# Patient Record
Sex: FEMALE | Race: WHITE | NOT HISPANIC OR LATINO | URBAN - METROPOLITAN AREA
[De-identification: names, ages, dates, MRNs, and addresses within clinical notes are randomized per-mention and may not be internally consistent; named-entity substitution may affect disease eponyms.]

---

## 2019-05-16 ENCOUNTER — OFFICE VISIT (OUTPATIENT)
Dept: URBAN - METROPOLITAN AREA CLINIC 17 | Facility: CLINIC | Age: 74
End: 2019-05-16
Payer: COMMERCIAL

## 2019-05-16 DIAGNOSIS — T37.2X5S ADVERSE EFFECT OF ANTIMALARIALS AND DRUGS ACTING ON OTHER BLOOD PROTOZOA, SEQUELA: ICD-10-CM

## 2019-05-16 DIAGNOSIS — H04.123 DRY EYE SYNDROME OF BILATERAL LACRIMAL GLANDS: ICD-10-CM

## 2019-05-16 PROCEDURE — 99213 OFFICE O/P EST LOW 20 MIN: CPT | Performed by: OPHTHALMOLOGY

## 2019-05-16 PROCEDURE — 92134 CPTRZ OPH DX IMG PST SGM RTA: CPT | Performed by: OPHTHALMOLOGY

## 2019-05-16 RX ORDER — PREDNISOLONE ACETATE 10 MG/ML
1 % SUSPENSION/ DROPS OPHTHALMIC
Qty: 1 | Refills: 3 | Status: ACTIVE
Start: 2019-05-16

## 2019-05-16 RX ORDER — CYCLOSPORINE 0.5 MG/ML
0.05 % EMULSION OPHTHALMIC
Qty: 180 | Refills: 3 | Status: ACTIVE
Start: 2019-05-16

## 2019-05-16 ASSESSMENT — INTRAOCULAR PRESSURE
OS: 8
OD: 9

## 2019-05-16 NOTE — IMPRESSION/PLAN
Impression: Other long term (current) drug therapy: Z79.899 OU. Plan: No toxicity seen. Okay to continue treatment.

## 2019-05-16 NOTE — IMPRESSION/PLAN
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 Plan: See above assessment.

## 2019-05-16 NOTE — IMPRESSION/PLAN
Impression: Dry eye syndrome of bilateral lacrimal glands: H04.123. Likely due to Plaquenil use. Plan: Discussed diagnosis in detail with patient. Continue Restasis BID OU (erx'd) & pred ace BID OU when symptoms flare up.

## 2019-05-16 NOTE — IMPRESSION/PLAN
Impression: Discoid lupus erythematosus: L93.0. Plan: Discussed condition with patient. There is no evidence of Plaquenil toxicity. Patient is okay to continue current treatment. Continue Restasis BID OU (erx'd) & pred ace BID OU when symptoms flare up.

## 2020-05-19 ENCOUNTER — OFFICE VISIT (OUTPATIENT)
Dept: URBAN - METROPOLITAN AREA CLINIC 17 | Facility: CLINIC | Age: 75
End: 2020-05-19
Payer: COMMERCIAL

## 2020-05-19 DIAGNOSIS — Z79.899 OTHER LONG TERM (CURRENT) DRUG THERAPY: ICD-10-CM

## 2020-05-19 DIAGNOSIS — L93.0 DISCOID LUPUS ERYTHEMATOSUS: Primary | ICD-10-CM

## 2020-05-19 PROCEDURE — 92014 COMPRE OPH EXAM EST PT 1/>: CPT | Performed by: OPHTHALMOLOGY

## 2020-05-19 PROCEDURE — 92134 CPTRZ OPH DX IMG PST SGM RTA: CPT | Performed by: OPHTHALMOLOGY

## 2020-05-19 ASSESSMENT — INTRAOCULAR PRESSURE
OS: 10
OD: 9

## 2021-03-23 ENCOUNTER — OFFICE VISIT (OUTPATIENT)
Dept: URBAN - METROPOLITAN AREA CLINIC 17 | Facility: CLINIC | Age: 76
End: 2021-03-23
Payer: COMMERCIAL

## 2021-03-23 DIAGNOSIS — S05.01XA CORNEAL ABRASION W/O FB OF RIGHT EYE, INITIAL ENCOUNTER: Primary | ICD-10-CM

## 2021-03-23 DIAGNOSIS — H57.11 OCULAR PAIN, RIGHT EYE: ICD-10-CM

## 2021-03-23 PROCEDURE — 92002 INTRM OPH EXAM NEW PATIENT: CPT | Performed by: OPTOMETRIST

## 2021-03-23 NOTE — IMPRESSION/PLAN
Impression: Corneal abrasion w/o FB of right eye, initial encounter: S05. 01XA. Plan: Discussed diagnosis in detail with patient. Discussed treatment options with patient. Discussed risks and benefits and patient understands. Advised patient of condition. Will continue to observe condition and or symptoms. Instilled Altafluor in OD and Placed a B-CTL in OD, pt advised lens will be removed on F/U, if lens falls out pt advised she will be fine.  Pt to continue Tobramycin Q2H OD

## 2021-03-25 ENCOUNTER — OFFICE VISIT (OUTPATIENT)
Dept: URBAN - METROPOLITAN AREA CLINIC 17 | Facility: CLINIC | Age: 76
End: 2021-03-25
Payer: COMMERCIAL

## 2021-03-25 DIAGNOSIS — S05.01XS: Primary | ICD-10-CM

## 2021-03-25 PROCEDURE — 99213 OFFICE O/P EST LOW 20 MIN: CPT | Performed by: OPTOMETRIST

## 2021-03-25 NOTE — IMPRESSION/PLAN
Impression: Corneal abrasion w/o foreign body of right eye, sequela: S05.01XS. Condition: resolved. Plan: Resolved. Removed BCTL today. Pt to continue Tobramycin QID x3days, then d/c.

## 2021-09-27 ENCOUNTER — OFFICE VISIT (OUTPATIENT)
Dept: URBAN - METROPOLITAN AREA CLINIC 17 | Facility: CLINIC | Age: 76
End: 2021-09-27
Payer: COMMERCIAL

## 2021-09-27 DIAGNOSIS — Z96.1 PRESENCE OF INTRAOCULAR LENS: ICD-10-CM

## 2021-09-27 DIAGNOSIS — H43.813 VITREOUS DEGENERATION, BILATERAL: ICD-10-CM

## 2021-09-27 DIAGNOSIS — T37.2X5D ADVERSE EFFECT OF ANTIMALARIAL, SUBSEQUENT ENCOUNTER: ICD-10-CM

## 2021-09-27 PROCEDURE — 92014 COMPRE OPH EXAM EST PT 1/>: CPT | Performed by: OPTOMETRIST

## 2021-09-27 PROCEDURE — 92083 EXTENDED VISUAL FIELD XM: CPT | Performed by: OPTOMETRIST

## 2021-09-27 PROCEDURE — 92134 CPTRZ OPH DX IMG PST SGM RTA: CPT | Performed by: OPTOMETRIST

## 2021-09-27 ASSESSMENT — INTRAOCULAR PRESSURE
OD: 12
OS: 12

## 2021-09-27 NOTE — IMPRESSION/PLAN
Impression: Other long term (current) drug therapy: Z79.899. Plan: Discussed condition with patient. There is no evidence of Plaquenil toxicity.  Patient is okay to continue current treatment

## 2021-09-27 NOTE — IMPRESSION/PLAN
Impression: Adverse effect of antimalarial, subsequent encounter: T37.2X5D.  Plan: see above assessment

## 2021-09-27 NOTE — IMPRESSION/PLAN
Impression: Discoid lupus erythematosus: L93.0. Plan: Discussed condition with patient. There is no evidence of Plaquenil toxicity.  Patient is okay to continue current treatment

## 2022-09-26 ENCOUNTER — OFFICE VISIT (OUTPATIENT)
Dept: URBAN - METROPOLITAN AREA CLINIC 17 | Facility: CLINIC | Age: 77
End: 2022-09-26
Payer: OTHER MISCELLANEOUS

## 2022-09-26 DIAGNOSIS — Z79.899 OTHER LONG TERM (CURRENT) DRUG THERAPY: ICD-10-CM

## 2022-09-26 DIAGNOSIS — L93.0 DISCOID LUPUS ERYTHEMATOSUS: Primary | ICD-10-CM

## 2022-09-26 DIAGNOSIS — H16.223 KERATOCONJUNCT SICCA, NOT SPECIFIED AS SJOGREN'S, BILATERAL: ICD-10-CM

## 2022-09-26 DIAGNOSIS — H43.813 VITREOUS DEGENERATION, BILATERAL: ICD-10-CM

## 2022-09-26 DIAGNOSIS — T37.2X5D ADVERSE EFFECT OF ANTIMALARIALS AND DRUGS ACTING ON OTHER BLOOD PROTOZOA, SUBSEQUENT ENCOUNTER: ICD-10-CM

## 2022-09-26 PROCEDURE — 92134 CPTRZ OPH DX IMG PST SGM RTA: CPT | Performed by: OPTOMETRIST

## 2022-09-26 PROCEDURE — 99214 OFFICE O/P EST MOD 30 MIN: CPT | Performed by: OPTOMETRIST

## 2022-09-26 RX ORDER — CYCLOSPORINE 0.5 MG/ML
0.05 % EMULSION OPHTHALMIC
Qty: 180 | Refills: 3 | Status: ACTIVE
Start: 2022-09-26

## 2022-09-26 ASSESSMENT — INTRAOCULAR PRESSURE
OS: 10
OD: 9

## 2022-09-26 NOTE — IMPRESSION/PLAN
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  Plan: See assessment #1

## 2022-09-26 NOTE — IMPRESSION/PLAN
Impression: Keratoconjunct sicca, not specified as Sjogren's, bilateral: D92.351. Plan: Discussed diagnosis with patient. Recommend OTC artificial tears in OU for comfort, 3-4x's a day. Patient may continue Restasis BID OU. Stressed importance of using ATs throughout the day in addition to the Restasis. Recommend gel drops and moisture mask for sleeping.

## 2022-09-26 NOTE — IMPRESSION/PLAN
Impression: Discoid lupus erythematosus: L93.0. Plan: No Plaquenil toxicity today. Discussed patient is slightly over dosed on medication due to weight. Patient states she has tried to reduce dose from 300mg daily to 200mg daily before but unable to manage Lupus symptoms. Will continue to observe condition and or symptoms. Reassured patient of current condition and treatment. Ordered and performed Mac OCT today.

## 2023-12-11 ENCOUNTER — OFFICE VISIT (OUTPATIENT)
Dept: URBAN - METROPOLITAN AREA CLINIC 17 | Facility: CLINIC | Age: 78
End: 2023-12-11
Payer: MEDICARE

## 2023-12-11 DIAGNOSIS — H43.813 VITREOUS DEGENERATION, BILATERAL: ICD-10-CM

## 2023-12-11 DIAGNOSIS — H17.89 OTHER CORNEAL SCAR: ICD-10-CM

## 2023-12-11 DIAGNOSIS — T37.2X5D ADVERSE EFFECT OF ANTIMALARIALS AND DRUGS ACTING ON OTHER BLOOD PROTOZOA, SUBSEQUENT ENCOUNTER: ICD-10-CM

## 2023-12-11 DIAGNOSIS — Z96.1 PRESENCE OF INTRAOCULAR LENS: ICD-10-CM

## 2023-12-11 DIAGNOSIS — H04.123 DRY EYE SYNDROME OF BILATERAL LACRIMAL GLANDS: ICD-10-CM

## 2023-12-11 DIAGNOSIS — M06.9 RHEUMATOID ARTHRITIS: Primary | ICD-10-CM

## 2023-12-11 DIAGNOSIS — Z79.899 OTHER LONG TERM (CURRENT) DRUG THERAPY: ICD-10-CM

## 2023-12-11 PROCEDURE — 92134 CPTRZ OPH DX IMG PST SGM RTA: CPT | Performed by: OPTOMETRIST

## 2023-12-11 PROCEDURE — 92014 COMPRE OPH EXAM EST PT 1/>: CPT | Performed by: OPTOMETRIST

## 2023-12-11 ASSESSMENT — INTRAOCULAR PRESSURE
OS: 13
OD: 14